# Patient Record
Sex: FEMALE
[De-identification: names, ages, dates, MRNs, and addresses within clinical notes are randomized per-mention and may not be internally consistent; named-entity substitution may affect disease eponyms.]

---

## 2022-08-01 PROBLEM — Z00.00 ENCOUNTER FOR PREVENTIVE HEALTH EXAMINATION: Status: ACTIVE | Noted: 2022-08-01

## 2022-08-09 ENCOUNTER — APPOINTMENT (OUTPATIENT)
Dept: ORTHOPEDIC SURGERY | Facility: CLINIC | Age: 32
End: 2022-08-09

## 2023-01-24 ENCOUNTER — APPOINTMENT (OUTPATIENT)
Dept: PHYSICAL MEDICINE AND REHAB | Facility: CLINIC | Age: 33
End: 2023-01-24

## 2024-08-06 ENCOUNTER — APPOINTMENT (OUTPATIENT)
Dept: OBGYN | Facility: CLINIC | Age: 34
End: 2024-08-06

## 2024-08-06 ENCOUNTER — TRANSCRIPTION ENCOUNTER (OUTPATIENT)
Age: 34
End: 2024-08-06

## 2024-08-06 PROBLEM — Z78.9 SOCIAL ALCOHOL USE: Status: ACTIVE | Noted: 2024-08-06

## 2024-08-06 PROBLEM — Z78.9 NON-SMOKER: Status: ACTIVE | Noted: 2024-08-06

## 2024-08-06 PROBLEM — Z82.49 FAMILY HISTORY OF HYPERTENSION: Status: ACTIVE | Noted: 2024-08-06

## 2024-08-06 PROBLEM — Z83.438 FAMILY HISTORY OF HYPERLIPIDEMIA: Status: ACTIVE | Noted: 2024-08-06

## 2024-08-06 PROBLEM — Z01.419 WELL WOMAN EXAM WITH ROUTINE GYNECOLOGICAL EXAM: Status: ACTIVE | Noted: 2024-08-06

## 2024-08-06 PROBLEM — Z87.42 HISTORY OF ABNORMAL CERVICAL PAPANICOLAOU SMEAR: Status: RESOLVED | Noted: 2024-08-06 | Resolved: 2024-08-06

## 2024-08-06 PROCEDURE — 99385 PREV VISIT NEW AGE 18-39: CPT

## 2024-08-06 PROCEDURE — 99459 PELVIC EXAMINATION: CPT

## 2024-08-06 NOTE — HISTORY OF PRESENT ILLNESS
[Patient reported PAP Smear was normal] : Patient reported PAP Smear was normal [Y] : Patient is sexually active [N] : Patient denies prior pregnancies [Normal Amount/Duration] :  normal amount and duration [Regular Cycle Intervals] : periods have been regular [Frequency: Q ___ days] : menstrual periods occur approximately every [unfilled] days [Menarche Age: ____] : age at menarche was [unfilled] [Menstrual Cramps] : menstrual cramps [Currently Active] : currently active [Men] : men [No] : No [Patient refuses STI testing] : Patient refuses STI testing [PapSmeardate] : 2023 [TextBox_31] : as per patient [LMPDate] : 7/20/2024 [MensesFreq] : 28 [MensesLength] : 6-7 [FreeTextEntry1] : 7/20/2024

## 2024-08-12 ENCOUNTER — TRANSCRIPTION ENCOUNTER (OUTPATIENT)
Age: 34
End: 2024-08-12

## 2024-08-15 ENCOUNTER — APPOINTMENT (OUTPATIENT)
Dept: PHYSICAL MEDICINE AND REHAB | Facility: CLINIC | Age: 34
End: 2024-08-15
Payer: COMMERCIAL

## 2024-08-15 VITALS
HEART RATE: 93 BPM | WEIGHT: 130 LBS | BODY MASS INDEX: 25.52 KG/M2 | DIASTOLIC BLOOD PRESSURE: 84 MMHG | OXYGEN SATURATION: 99 % | HEIGHT: 60 IN | SYSTOLIC BLOOD PRESSURE: 121 MMHG

## 2024-08-15 PROCEDURE — 99203 OFFICE O/P NEW LOW 30 MIN: CPT

## 2024-08-15 NOTE — HISTORY OF PRESENT ILLNESS
[FreeTextEntry1] : Ms. YASH GOOD is a 34 year old female who presents with intractable chronic migraine, here for evaluation for botulinum toxin injections.    TYPE: Intractable Grade 4 (346.71)   Age of Onset: 12 years old Bilateral: L>R Symptoms getting worse in the last few years Duration of Headache: 4- 24 hrs Frequency: Patient endorses 15+ headaches a month and more than 10 migraines per month Pain described as:  sharp, tense, throbbing, knife like.  Positive Family History - father with chronic migraines MRI/CT: does not recall. Has had neurologic evaluation years ago.   Associated Features: Nausea, Photophobia, Unilateral, Pulsating. Denies auras   Disability due to Migraine - Lost days from work/school, or frequent tardiness. ER or MD visits: none recently   Many medications have been tried and have not been effective or tolerated including: > Abortives - NSAID's / Triptans  > Prophylactics - Tylenol > Increasing dosing of maintenance or breakthrough medication > Vitamin B6, Magnesium, other over the counter > Stress reduction exercise, acupuncture, avoidance of triggers   Treatment regiments to date have not been effective in controlling symptoms and have caused intolerance with time or other.

## 2024-08-15 NOTE — ASSESSMENT
[FreeTextEntry1] : 34-year-old with intractable migraine headaches  PLAN: Based on the clinical findings and presentation my conclusion is that this patient is an excellent candidate for OnabotulinumtoxinA (Botox).   Discussed- frequent risks and benefits.  Frequent adverse reactions are dysphagia 19% (usually mild), URTI (12%), neck pain 11%, ptosis 20%, and malaise. Side effects only last days to weeks, and are self limiting. Rare complications include: anaphylaxis and bleeding. Explained possible distant spread of toxin effect.   Botox injections for chronic intractable migraines (G43.019) are FDA approved and on label.  200 units are required with 155 units injected as per algorithm divided into 31 sites. Written informed consent will be obtained at the time of injection. Pregnancy and lactation preclude injection.   Protocol - Patient reminded that effects of Botox take 5-7 days to take hold - Botox repeated every three months   Contraindications were reviewed: (Aminoglycoside antibiotics concurrently, Neuro-muscular transmission disease or drugs, Pregnancy/ Lactation, Neuro-muscular diseases, Neuro-transmitter drugs)

## 2024-08-15 NOTE — PHYSICAL EXAM
[FreeTextEntry1] : General: Awake and alert in no acute distress Psych: normal mood and affect HEENT: NC/AT, normal visual tracking Pulmonary: no resp distress, chest expansion appears symmetrical CV: extremities are warm and perfused Abd: non-distended Ext: no c/c/e Skin: normal color, appearance, and temperature   Cervical Spine/Shoulder: Inspection: normal muscle bulk without asymmetry Tenderness to palpation: TTP bilateral cervical paraspinals, upper trapezius, occipitalis ROM: within functional limits  Neuro: EOMI, no facial asymmetry, visual fields intact MMT: 5/5 bilateral upper extremities Sensory: intact to light touch in all dermatomes of the bilateral upper extremities  Provocative testing: Dueñas - negative Lhermitte - negative

## 2024-08-16 ENCOUNTER — TRANSCRIPTION ENCOUNTER (OUTPATIENT)
Age: 34
End: 2024-08-16

## 2024-09-03 ENCOUNTER — APPOINTMENT (OUTPATIENT)
Dept: INTERNAL MEDICINE | Facility: CLINIC | Age: 34
End: 2024-09-03
Payer: COMMERCIAL

## 2024-09-03 ENCOUNTER — APPOINTMENT (OUTPATIENT)
Dept: PHYSICAL MEDICINE AND REHAB | Facility: CLINIC | Age: 34
End: 2024-09-03

## 2024-09-03 ENCOUNTER — LABORATORY RESULT (OUTPATIENT)
Age: 34
End: 2024-09-03

## 2024-09-03 VITALS
TEMPERATURE: 98.3 F | OXYGEN SATURATION: 100 % | HEART RATE: 78 BPM | RESPIRATION RATE: 16 BRPM | DIASTOLIC BLOOD PRESSURE: 81 MMHG | WEIGHT: 134.48 LBS | SYSTOLIC BLOOD PRESSURE: 135 MMHG | BODY MASS INDEX: 26.26 KG/M2

## 2024-09-03 VITALS — WEIGHT: 130 LBS | BODY MASS INDEX: 25.52 KG/M2 | HEIGHT: 60 IN

## 2024-09-03 VITALS
RESPIRATION RATE: 16 BRPM | HEART RATE: 74 BPM | SYSTOLIC BLOOD PRESSURE: 118 MMHG | DIASTOLIC BLOOD PRESSURE: 82 MMHG | OXYGEN SATURATION: 99 %

## 2024-09-03 DIAGNOSIS — R79.89 OTHER SPECIFIED ABNORMAL FINDINGS OF BLOOD CHEMISTRY: ICD-10-CM

## 2024-09-03 DIAGNOSIS — Z00.00 ENCOUNTER FOR GENERAL ADULT MEDICAL EXAMINATION W/OUT ABNORMAL FINDINGS: ICD-10-CM

## 2024-09-03 PROCEDURE — 36415 COLL VENOUS BLD VENIPUNCTURE: CPT

## 2024-09-03 PROCEDURE — 64615 CHEMODENERV MUSC MIGRAINE: CPT

## 2024-09-03 PROCEDURE — 99385 PREV VISIT NEW AGE 18-39: CPT

## 2024-09-03 NOTE — PROCEDURE
[de-identified] :   NEUROTOXIN PROCEDURE NOTE- MIGRAINE BOTOX TOXIN NEUROLYSIS recommended as a means of intervening in the above problems verbal pre consent obtained   OnabotulinumtoxinA (200 units ) reconstituted with preservative free saline   Dilution 1:2 area cleaned with alcohol   SITES (R) and (L)                           10 units (R) and (L) Procerus                              5 (R) and (L) Frontalis                              20 (R) and (L) Temporalis                          40 (R) and (L) Occipitalis                           30 (R) and (L) Cervical Paraspinals           20 (R) and (L) Trapezius                            30   SUBTOTAL                                          155 Wasted                                                 45   Total                                                     200 units

## 2024-09-03 NOTE — ASSESSMENT
[FreeTextEntry1] : 34-year-old with intractable migraine headaches here for follow up for botulinum toxin injections  PLAN: - Botox injections performed today per Migraine Protocol. Tolerated procedure well without complications. - Discussed potential side effects - Advised cold compress and Tylenol if experiencing headaches or pain - Follow up in 3 weeks to assess effectiveness. If noting improvement will repeat q3 months

## 2024-09-03 NOTE — HISTORY OF PRESENT ILLNESS
[FreeTextEntry1] : Ms. YASH GOOD is a 34 year old female who presents with intractable chronic migraine, here for evaluation for botulinum toxin injections.    09/03/2024 -- Botox Injections   08/15/2024 -- Initial Evaluation  Age of Onset: 12 years old Bilateral: L>R Symptoms getting worse in the last few years Duration of Headache: 4- 24 hrs Frequency: Patient endorses 15+ headaches a month and more than 10 migraines per month Pain described as:  sharp, tense, throbbing, knife like.  Positive Family History - father with chronic migraines MRI/CT: does not recall. Has had neurologic evaluation years ago.   Associated Features: Nausea, Photophobia, Unilateral, Pulsating. Denies auras   Disability due to Migraine - Lost days from work/school, or frequent tardiness. ER or MD visits: none recently   Many medications have been tried and have not been effective or tolerated including: > Abortives - NSAID's / Triptans  > Prophylactics - Tylenol > Increasing dosing of maintenance or breakthrough medication > Vitamin B6, Magnesium, other over the counter > Stress reduction exercise, acupuncture, avoidance of triggers   Treatment regiments to date have not been effective in controlling symptoms and have caused intolerance with time or other.

## 2024-09-04 PROBLEM — R79.89 ABNORMAL CBC: Status: ACTIVE | Noted: 2024-09-04

## 2024-09-04 NOTE — HEALTH RISK ASSESSMENT
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

## 2024-09-04 NOTE — HISTORY OF PRESENT ILLNESS
[FreeTextEntry1] : CPE  [de-identified] : 34 F PMH HLD, migraines presents for CPE. States she east healthy and has more physically active. Has been going to gym run more, do solid core twice a week. She notices still she is having trouble losing weight, notices face is puffy.

## 2024-09-04 NOTE — PHYSICAL EXAM
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 Note Text (......Xxx Chief Complaint.): This diagnosis correlates with the Detail Level: Simple [No Acute Distress] : no acute distress [Well Nourished] : well nourished [Well Developed] : well developed [Well-Appearing] : well-appearing [Normal Sclera/Conjunctiva] : normal sclera/conjunctiva [PERRL] : pupils equal round and reactive to light [EOMI] : extraocular movements intact [Normal Outer Ear/Nose] : the outer ears and nose were normal in appearance [Normal Oropharynx] : the oropharynx was normal [No JVD] : no jugular venous distention [No Lymphadenopathy] : no lymphadenopathy [Supple] : supple [Thyroid Normal, No Nodules] : the thyroid was normal and there were no nodules present [No Respiratory Distress] : no respiratory distress  [No Accessory Muscle Use] : no accessory muscle use [Clear to Auscultation] : lungs were clear to auscultation bilaterally [Normal Rate] : normal rate  [Regular Rhythm] : with a regular rhythm [Normal S1, S2] : normal S1 and S2 [No Murmur] : no murmur heard [No Carotid Bruits] : no carotid bruits [No Abdominal Bruit] : a ~M bruit was not heard ~T in the abdomen [No Varicosities] : no varicosities [Pedal Pulses Present] : the pedal pulses are present [No Edema] : there was no peripheral edema [No Palpable Aorta] : no palpable aorta [No Extremity Clubbing/Cyanosis] : no extremity clubbing/cyanosis [Soft] : abdomen soft [Non Tender] : non-tender [Non-distended] : non-distended [No Masses] : no abdominal mass palpated [No HSM] : no HSM [Normal Bowel Sounds] : normal bowel sounds [Normal Posterior Cervical Nodes] : no posterior cervical lymphadenopathy [Normal Anterior Cervical Nodes] : no anterior cervical lymphadenopathy [No CVA Tenderness] : no CVA  tenderness [No Spinal Tenderness] : no spinal tenderness [No Joint Swelling] : no joint swelling [Grossly Normal Strength/Tone] : grossly normal strength/tone [No Rash] : no rash [Coordination Grossly Intact] : coordination grossly intact [No Focal Deficits] : no focal deficits [Normal Gait] : normal gait [Deep Tendon Reflexes (DTR)] : deep tendon reflexes were 2+ and symmetric [Normal Affect] : the affect was normal [Normal Insight/Judgement] : insight and judgment were intact

## 2024-09-04 NOTE — HEALTH RISK ASSESSMENT
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

## 2024-09-04 NOTE — HISTORY OF PRESENT ILLNESS
[FreeTextEntry1] : CPE  [de-identified] : 34 F PMH HLD, migraines presents for CPE. States she east healthy and has more physically active. Has been going to gym run more, do solid core twice a week. She notices still she is having trouble losing weight, notices face is puffy.

## 2024-09-09 ENCOUNTER — TRANSCRIPTION ENCOUNTER (OUTPATIENT)
Age: 34
End: 2024-09-09

## 2024-09-09 DIAGNOSIS — D56.3 THALASSEMIA MINOR: ICD-10-CM

## 2024-09-09 LAB
25(OH)D3 SERPL-MCNC: 26.1 NG/ML
ALBUMIN SERPL ELPH-MCNC: 4.8 G/DL
ALP BLD-CCNC: 70 U/L
ALT SERPL-CCNC: 18 U/L
ANION GAP SERPL CALC-SCNC: 13 MMOL/L
AST SERPL-CCNC: 16 U/L
BASOPHILS # BLD AUTO: 0.12 K/UL
BASOPHILS NFR BLD AUTO: 1.7 %
BILIRUB SERPL-MCNC: 0.3 MG/DL
BUN SERPL-MCNC: 11 MG/DL
CALCIUM SERPL-MCNC: 9.5 MG/DL
CHLORIDE SERPL-SCNC: 101 MMOL/L
CHOLEST SERPL-MCNC: 218 MG/DL
CO2 SERPL-SCNC: 24 MMOL/L
CREAT SERPL-MCNC: 0.66 MG/DL
EGFR: 119 ML/MIN/1.73M2
EOSINOPHIL # BLD AUTO: 0.06 K/UL
EOSINOPHIL NFR BLD AUTO: 0.9 %
ESTIMATED AVERAGE GLUCOSE: 105 MG/DL
FERRITIN SERPL-MCNC: 53 NG/ML
FOLATE SERPL-MCNC: 10.7 NG/ML
GLUCOSE SERPL-MCNC: 84 MG/DL
HBA1C MFR BLD HPLC: 5.3 %
HCT VFR BLD CALC: 38.6 %
HDLC SERPL-MCNC: 54 MG/DL
HGB BLD-MCNC: 11.6 G/DL
IRON SATN MFR SERPL: 23 %
IRON SERPL-MCNC: 66 UG/DL
LDLC SERPL CALC-MCNC: 122 MG/DL
LYMPHOCYTES # BLD AUTO: 2.31 K/UL
LYMPHOCYTES NFR BLD AUTO: 33.1 %
MAN DIFF?: NORMAL
MCHC RBC-ENTMCNC: 19.6 PG
MCHC RBC-ENTMCNC: 30.1 GM/DL
MCV RBC AUTO: 65.3 FL
MONOCYTES # BLD AUTO: 0.18 K/UL
MONOCYTES NFR BLD AUTO: 2.6 %
NEUTROPHILS # BLD AUTO: 4.31 K/UL
NEUTROPHILS NFR BLD AUTO: 61.7 %
NONHDLC SERPL-MCNC: 164 MG/DL
PLATELET # BLD AUTO: 299 K/UL
POTASSIUM SERPL-SCNC: 4.4 MMOL/L
PROT SERPL-MCNC: 7.9 G/DL
RBC # BLD: 5.91 M/UL
RBC # FLD: 16.6 %
SODIUM SERPL-SCNC: 138 MMOL/L
TIBC SERPL-MCNC: 295 UG/DL
TRIGL SERPL-MCNC: 240 MG/DL
TSH SERPL-ACNC: 2.32 UIU/ML
UIBC SERPL-MCNC: 229 UG/DL
VIT B12 SERPL-MCNC: 432 PG/ML
WBC # FLD AUTO: 6.98 K/UL

## 2024-09-10 ENCOUNTER — TRANSCRIPTION ENCOUNTER (OUTPATIENT)
Age: 34
End: 2024-09-10

## 2024-10-03 ENCOUNTER — APPOINTMENT (OUTPATIENT)
Dept: DERMATOLOGY | Facility: CLINIC | Age: 34
End: 2024-10-03

## 2024-11-21 DIAGNOSIS — G43.719 CHRONIC MIGRAINE W/OUT AURA, INTRACTABLE, W/OUT STATUS MIGRAINOSUS: ICD-10-CM

## 2024-12-04 ENCOUNTER — APPOINTMENT (OUTPATIENT)
Dept: PHYSICAL MEDICINE AND REHAB | Facility: CLINIC | Age: 34
End: 2024-12-04
Payer: COMMERCIAL

## 2024-12-04 VITALS — DIASTOLIC BLOOD PRESSURE: 81 MMHG | HEART RATE: 80 BPM | SYSTOLIC BLOOD PRESSURE: 120 MMHG

## 2024-12-04 PROCEDURE — 64615 CHEMODENERV MUSC MIGRAINE: CPT

## 2024-12-19 ENCOUNTER — APPOINTMENT (OUTPATIENT)
Dept: PHYSICAL MEDICINE AND REHAB | Facility: CLINIC | Age: 34
End: 2024-12-19
Payer: COMMERCIAL

## 2024-12-19 PROCEDURE — 64615 CHEMODENERV MUSC MIGRAINE: CPT

## 2025-03-05 ENCOUNTER — APPOINTMENT (OUTPATIENT)
Dept: PHYSICAL MEDICINE AND REHAB | Facility: CLINIC | Age: 35
End: 2025-03-05
Payer: COMMERCIAL

## 2025-03-05 PROCEDURE — 64615 CHEMODENERV MUSC MIGRAINE: CPT

## 2025-05-28 ENCOUNTER — TRANSCRIPTION ENCOUNTER (OUTPATIENT)
Age: 35
End: 2025-05-28

## 2025-06-18 ENCOUNTER — APPOINTMENT (OUTPATIENT)
Dept: PHYSICAL MEDICINE AND REHAB | Facility: CLINIC | Age: 35
End: 2025-06-18
Payer: COMMERCIAL

## 2025-06-18 VITALS — DIASTOLIC BLOOD PRESSURE: 78 MMHG | HEART RATE: 78 BPM | SYSTOLIC BLOOD PRESSURE: 124 MMHG

## 2025-06-18 PROCEDURE — 64615 CHEMODENERV MUSC MIGRAINE: CPT

## 2025-06-30 PROBLEM — H02.409: Status: ACTIVE | Noted: 2025-06-30

## 2025-06-30 RX ORDER — APRACLONIDINE 5.75 MG/ML
0.5 SOLUTION OPHTHALMIC DAILY
Qty: 1 | Refills: 0 | Status: ACTIVE | COMMUNITY
Start: 2025-06-30 | End: 1900-01-01

## 2025-07-30 ENCOUNTER — APPOINTMENT (OUTPATIENT)
Dept: NEUROLOGY | Facility: CLINIC | Age: 35
End: 2025-07-30

## 2025-08-26 ENCOUNTER — NON-APPOINTMENT (OUTPATIENT)
Age: 35
End: 2025-08-26

## 2025-08-27 ENCOUNTER — APPOINTMENT (OUTPATIENT)
Dept: OBGYN | Facility: CLINIC | Age: 35
End: 2025-08-27
Payer: COMMERCIAL

## 2025-08-27 VITALS
OXYGEN SATURATION: 99 % | SYSTOLIC BLOOD PRESSURE: 120 MMHG | WEIGHT: 135 LBS | DIASTOLIC BLOOD PRESSURE: 79 MMHG | BODY MASS INDEX: 26.5 KG/M2 | HEART RATE: 87 BPM | HEIGHT: 60 IN

## 2025-08-27 DIAGNOSIS — Z01.419 ENCOUNTER FOR GYNECOLOGICAL EXAMINATION (GENERAL) (ROUTINE) W/OUT ABNORMAL FINDINGS: ICD-10-CM

## 2025-08-27 PROCEDURE — 99459 PELVIC EXAMINATION: CPT

## 2025-08-27 PROCEDURE — 99395 PREV VISIT EST AGE 18-39: CPT

## 2025-08-29 LAB — HPV HIGH+LOW RISK DNA PNL CVX: NOT DETECTED

## 2025-09-01 LAB — CYTOLOGY CVX/VAG DOC THIN PREP: ABNORMAL

## 2025-09-10 ENCOUNTER — APPOINTMENT (OUTPATIENT)
Dept: INTERNAL MEDICINE | Facility: CLINIC | Age: 35
End: 2025-09-10
Payer: COMMERCIAL

## 2025-09-10 ENCOUNTER — LABORATORY RESULT (OUTPATIENT)
Age: 35
End: 2025-09-10

## 2025-09-10 VITALS
HEIGHT: 60 IN | SYSTOLIC BLOOD PRESSURE: 132 MMHG | HEART RATE: 86 BPM | OXYGEN SATURATION: 98 % | DIASTOLIC BLOOD PRESSURE: 79 MMHG | WEIGHT: 135 LBS | BODY MASS INDEX: 26.5 KG/M2 | TEMPERATURE: 97.7 F

## 2025-09-10 DIAGNOSIS — D56.3 THALASSEMIA MINOR: ICD-10-CM

## 2025-09-10 DIAGNOSIS — Z00.00 ENCOUNTER FOR GENERAL ADULT MEDICAL EXAMINATION W/OUT ABNORMAL FINDINGS: ICD-10-CM

## 2025-09-10 PROCEDURE — 36415 COLL VENOUS BLD VENIPUNCTURE: CPT

## 2025-09-10 PROCEDURE — 99395 PREV VISIT EST AGE 18-39: CPT

## 2025-09-16 ENCOUNTER — TRANSCRIPTION ENCOUNTER (OUTPATIENT)
Age: 35
End: 2025-09-16

## 2025-09-16 DIAGNOSIS — M62.838 OTHER MUSCLE SPASM: ICD-10-CM

## 2025-09-16 LAB
25(OH)D3 SERPL-MCNC: 32.8 NG/ML
ALBUMIN SERPL ELPH-MCNC: 4.8 G/DL
ALP BLD-CCNC: 71 U/L
ALT SERPL-CCNC: 34 U/L
ANION GAP SERPL CALC-SCNC: 14 MMOL/L
APPEARANCE: CLEAR
AST SERPL-CCNC: 21 U/L
BASOPHILS # BLD AUTO: 0.05 K/UL
BASOPHILS NFR BLD AUTO: 0.8 %
BILIRUB SERPL-MCNC: 0.4 MG/DL
BILIRUBIN URINE: NEGATIVE
BLOOD URINE: NEGATIVE
BUN SERPL-MCNC: 9 MG/DL
CALCIUM SERPL-MCNC: 9.6 MG/DL
CHLORIDE SERPL-SCNC: 103 MMOL/L
CHOLEST SERPL-MCNC: 239 MG/DL
CO2 SERPL-SCNC: 20 MMOL/L
COLOR: YELLOW
CREAT SERPL-MCNC: 0.53 MG/DL
EGFRCR SERPLBLD CKD-EPI 2021: 125 ML/MIN/1.73M2
EOSINOPHIL # BLD AUTO: 0.16 K/UL
EOSINOPHIL NFR BLD AUTO: 2.4 %
ESTIMATED AVERAGE GLUCOSE: 111 MG/DL
GLUCOSE QUALITATIVE U: NEGATIVE MG/DL
GLUCOSE SERPL-MCNC: 93 MG/DL
HBA1C MFR BLD HPLC: 5.5 %
HCT VFR BLD CALC: 37.1 %
HDLC SERPL-MCNC: 50 MG/DL
HGB A MFR BLD: 95.1 %
HGB A2 MFR BLD: 4.9 %
HGB BLD-MCNC: 11.3 G/DL
HGB FRACT BLD-IMP: NORMAL
IMM GRANULOCYTES NFR BLD AUTO: 0.2 %
KETONES URINE: NEGATIVE MG/DL
LDLC SERPL-MCNC: 169 MG/DL
LEUKOCYTE ESTERASE URINE: NEGATIVE
LYMPHOCYTES # BLD AUTO: 1.86 K/UL
LYMPHOCYTES NFR BLD AUTO: 28.2 %
MAN DIFF?: NORMAL
MCHC RBC-ENTMCNC: 19.7 PG
MCHC RBC-ENTMCNC: 30.5 G/DL
MCV RBC AUTO: 64.5 FL
MONOCYTES # BLD AUTO: 0.5 K/UL
MONOCYTES NFR BLD AUTO: 7.6 %
NEUTROPHILS # BLD AUTO: 4.01 K/UL
NEUTROPHILS NFR BLD AUTO: 60.8 %
NITRITE URINE: NEGATIVE
NONHDLC SERPL-MCNC: 189 MG/DL
PH URINE: 6
PLATELET # BLD AUTO: 284 K/UL
POTASSIUM SERPL-SCNC: 4 MMOL/L
PROT SERPL-MCNC: 7.7 G/DL
PROTEIN URINE: NEGATIVE MG/DL
RBC # BLD: 5.75 M/UL
RBC # FLD: 16.9 %
SODIUM SERPL-SCNC: 137 MMOL/L
SPECIFIC GRAVITY URINE: 1.01
TRIGL SERPL-MCNC: 111 MG/DL
TSH SERPL-ACNC: 1.66 UIU/ML
UROBILINOGEN URINE: 0.2 MG/DL
WBC # FLD AUTO: 6.59 K/UL

## 2025-09-16 RX ORDER — TIZANIDINE 2 MG/1
2 TABLET ORAL
Qty: 60 | Refills: 2 | Status: ACTIVE | COMMUNITY
Start: 2025-09-16 | End: 1900-01-01